# Patient Record
Sex: MALE | Race: WHITE | NOT HISPANIC OR LATINO | Employment: OTHER | URBAN - METROPOLITAN AREA
[De-identification: names, ages, dates, MRNs, and addresses within clinical notes are randomized per-mention and may not be internally consistent; named-entity substitution may affect disease eponyms.]

---

## 2023-12-27 PROBLEM — I47.10 SUPRAVENTRICULAR TACHYCARDIA (CMS-HCC): Status: ACTIVE | Noted: 2023-12-27

## 2023-12-27 PROBLEM — U07.1 COVID-19: Status: ACTIVE | Noted: 2023-12-27

## 2023-12-27 PROBLEM — G70.00 MYASTHENIA GRAVIS (MULTI): Status: ACTIVE | Noted: 2023-12-27

## 2023-12-27 PROBLEM — R00.0 TACHYCARDIA: Status: ACTIVE | Noted: 2023-12-27

## 2023-12-27 PROBLEM — I48.19 PERSISTENT ATRIAL FIBRILLATION (MULTI): Status: ACTIVE | Noted: 2023-12-27

## 2023-12-27 PROBLEM — Q21.10 ATRIAL SEPTAL DEFECT (HHS-HCC): Status: ACTIVE | Noted: 2023-12-27

## 2023-12-27 PROBLEM — I10 BENIGN ESSENTIAL HYPERTENSION: Status: ACTIVE | Noted: 2023-12-27

## 2023-12-27 PROBLEM — E78.5 DYSLIPIDEMIA: Status: ACTIVE | Noted: 2023-12-27

## 2023-12-27 PROBLEM — I48.91 ATRIAL FIBRILLATION (MULTI): Status: ACTIVE | Noted: 2023-12-27

## 2023-12-27 PROBLEM — R06.02 SHORTNESS OF BREATH: Status: ACTIVE | Noted: 2023-12-27

## 2023-12-27 RX ORDER — CHOLECALCIFEROL (VITAMIN D3) 25 MCG
2 TABLET ORAL DAILY
COMMUNITY

## 2023-12-27 RX ORDER — METOPROLOL SUCCINATE 25 MG/1
25 TABLET, EXTENDED RELEASE ORAL DAILY
COMMUNITY
Start: 2023-02-23 | End: 2024-05-13 | Stop reason: SDUPTHER

## 2023-12-27 RX ORDER — APIXABAN 5 MG/1
5 TABLET, FILM COATED ORAL 2 TIMES DAILY
COMMUNITY
Start: 2021-06-08 | End: 2024-05-13 | Stop reason: SDUPTHER

## 2023-12-27 RX ORDER — FERROUS SULFATE 325(65) MG
1 TABLET ORAL DAILY
COMMUNITY
End: 2024-05-13 | Stop reason: WASHOUT

## 2023-12-27 RX ORDER — LATANOPROST 50 UG/ML
1 SOLUTION/ DROPS OPHTHALMIC
COMMUNITY

## 2024-05-03 ENCOUNTER — TELEPHONE (OUTPATIENT)
Dept: CARDIOLOGY | Facility: CLINIC | Age: 84
End: 2024-05-03
Payer: MEDICARE

## 2024-05-03 NOTE — TELEPHONE ENCOUNTER
Pt called the office stating he is having difficulty breathing x last 2 weeks. He does not describe this as SOB. He is more lethargic.    Pt went to doctor yesterday and has bronchitis and was prescribed an antibiotic.    Pt asking for sooner appt with Dr. Schafer. Transferred to [facility] for soon appt

## 2024-05-06 ENCOUNTER — OFFICE VISIT (OUTPATIENT)
Dept: CARDIOLOGY | Facility: CLINIC | Age: 84
End: 2024-05-06
Payer: MEDICARE

## 2024-05-06 VITALS
WEIGHT: 197.2 LBS | DIASTOLIC BLOOD PRESSURE: 84 MMHG | HEART RATE: 88 BPM | HEIGHT: 72 IN | BODY MASS INDEX: 26.71 KG/M2 | SYSTOLIC BLOOD PRESSURE: 128 MMHG

## 2024-05-06 DIAGNOSIS — I48.11 LONGSTANDING PERSISTENT ATRIAL FIBRILLATION (MULTI): ICD-10-CM

## 2024-05-06 DIAGNOSIS — R06.02 SHORTNESS OF BREATH: Primary | ICD-10-CM

## 2024-05-06 PROCEDURE — 3079F DIAST BP 80-89 MM HG: CPT | Performed by: NURSE PRACTITIONER

## 2024-05-06 PROCEDURE — 1160F RVW MEDS BY RX/DR IN RCRD: CPT | Performed by: NURSE PRACTITIONER

## 2024-05-06 PROCEDURE — 3074F SYST BP LT 130 MM HG: CPT | Performed by: NURSE PRACTITIONER

## 2024-05-06 PROCEDURE — 1159F MED LIST DOCD IN RCRD: CPT | Performed by: NURSE PRACTITIONER

## 2024-05-06 PROCEDURE — 99213 OFFICE O/P EST LOW 20 MIN: CPT | Performed by: NURSE PRACTITIONER

## 2024-05-06 NOTE — PROGRESS NOTES
Vimal Luther is a 84 y.o. male that presents to the office today for cardiac evaluation.  He follows with his  primary cardiologist Dr. Schafer and was added to my schedule today.      Per Dr. Schafer office notes, he has a PMH of persistent atrial fibrillation and PSVT. 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 He underwent cardioversion by Dr. Zimmerman in April 2015. At the time of a follow-up visit in July 2018 he was noted to have recurrent asymptomatic atrial fibrillation with controlled heart rates. A Holter monitor done July 19, 2018 showed persistent atrial fibrillation throughout the study with an average heart rate 80 bpm. He opted for a rate control strategy. He is chronically anticoagulated with Eliquis 5 mg twice daily. An echocardiogram performed in October 2018 showed normal LV systolic function and trivial tricuspid regurgitation with estimated RVSP of 40 mmHg.      He was diagnosed with Covid 19 pneumonia on February 8, 2021. He was admitted to [facility]. He was hospitalized for 23 days. His initial EKG showed atrial fibrillation with a heart rate of 110 bpm. Troponin was negative. He was diagnosed with acute respiratory failure with hypoxia as well as ARDS. He was treated with remdesivir and Decadron. During that admission his Toprol-XL was changed to Cardizem CD.     At his last office visit with Dr. Schafer 9/7/2023, he reported he continued to do well since his last visit. He remains very active. He golfs about four days a week at [employer].     He reports shortness of breath with exertion that has been ongoing for approximately 4 months. He was recently diagnosed with bronchitis but feels that shortness of breath is different. He continues to be active with golfing and working out at the gym.  He denies any chest pain, chest pressure, chest tightness, palpitations, lightheadedness or dizziness.       Testing Reviewed  10/4/2023 labs: NA  141, K4.7, BUN 26, creatinine 1.13, AST 32, ALT 26, WBC 4.4, Hgb 15,      Assessment/Plan  Persistent atrial fibrillation:  Rate control strategy.  He did not bring medication bottles with him to office visit today and is unsure of medications or dosages.  Anticoagulation:  Eliquis. Leonard bleeding. Reports compliance.   Shortness of breath:  Reports worsening exertional shortness of breath.  Obtain labs basic and CBC along with echocardiogram to assess for valve and pump function.   Follow with Dr. Schafer after testing for results and further recommendations.       Patient Active Problem List   Diagnosis    Atrial septal defect (HHS-HCC)    Benign essential hypertension    COVID-19    Dyslipidemia    Myasthenia gravis (Multi)    Atrial fibrillation (Multi)    Persistent atrial fibrillation (Multi)    Supraventricular tachycardia (CMS-HCC)    Shortness of breath    Tachycardia            No past medical history on file.      Current Outpatient Medications:     cholecalciferol (Vitamin D-3) 25 MCG (1000 UT) tablet, Take 2 tablets (50 mcg) by mouth once daily., Disp: , Rfl:     Eliquis 5 mg tablet, Take 1 tablet (5 mg) by mouth 2 times a day., Disp: , Rfl:     ferrous sulfate, 325 mg ferrous sulfate, tablet, Take 1 tablet by mouth once daily., Disp: , Rfl:     latanoprost (Xalatan) 0.005 % ophthalmic solution, Administer 1 drop into both eyes once daily., Disp: , Rfl:     metoprolol succinate XL (Toprol-XL) 25 mg 24 hr tablet, Take 1 tablet (25 mg) by mouth once daily., Disp: , Rfl:     Patient has no known allergies.    No family history on file.    Past Surgical History:   Procedure Laterality Date    OTHER SURGICAL HISTORY  10/08/2021    Cardioversion    OTHER SURGICAL HISTORY  10/08/2021    Catheter ablation    OTHER SURGICAL HISTORY  10/08/2021    Knee replacement    OTHER SURGICAL HISTORY  10/08/2021    Thymectomy          Review of systems  Constitutional: No weight loss, fever, chills, weakness or  fatigue  HEENT: No visual loss, blurred vision, double vision or yellow sclerae  Skin: No rash or itching  Cardiovascular: No chest pain, pressure or discomfort, No palpitations or edema.  Respiratory: Positive for exertional  shortness of breath, denies cough or sputum  Gastrointestinal: No nausea, vomiting or diarrhea. No bloody or dark tarry stools.  Neurological: No headache, lightheadedness, dizziness, syncope.   Musculoskeletal: No muscle, back pain, joint pain or stiffness.  Hematologic: No anemia, bleeding or bruising.    /84 (BP Location: Left arm, Patient Position: Sitting)   Pulse 88 Comment: Apical  Ht 1.829 m (6')   Wt 89.4 kg (197 lb 3.2 oz)   BMI 26.75 kg/m²     Patient Active Problem List   Diagnosis    Atrial septal defect (HHS-HCC)    Benign essential hypertension    COVID-19    Dyslipidemia    Myasthenia gravis (Multi)    Atrial fibrillation (Multi)    Persistent atrial fibrillation (Multi)    Supraventricular tachycardia (CMS-HCC)    Shortness of breath    Tachycardia         Physical Exam  Constitutional: Well developed, awake/alert x 3, no distress.  Head/Neck: No JVD,   Respiratory/Thorax: patent airways, CTAB, normal breath sounds with good expansion.  Cardiovascular: Regular rate and rhythm, no murmurs, normal S1 and S2,   Gastrointestinal: Non distended, soft, non-tender, no rebound tenderness or guarding.  Extremities: No cyanosis, edema.    Neurological: Alert and oriented x 3. Moves extremities spontaneous with purpose.  Psychological: Appropriate mood and behavior  Skin: Warm and Dry. No lesions or rashes.         Please excuse any errors in grammar or translation related to dictation, voice recognition software was used to prepare this document.        Medication list updated verbally with patient. Each medication reviewed one by one.  Patient did not bring a list or bottles.  Nelli Doyle, CMA

## 2024-05-10 ENCOUNTER — LAB (OUTPATIENT)
Dept: LAB | Facility: LAB | Age: 84
End: 2024-05-10
Payer: MEDICARE

## 2024-05-10 ENCOUNTER — ANCILLARY PROCEDURE (OUTPATIENT)
Dept: CARDIOLOGY | Facility: HOSPITAL | Age: 84
End: 2024-05-10
Payer: MEDICARE

## 2024-05-10 DIAGNOSIS — R06.02 SHORTNESS OF BREATH: ICD-10-CM

## 2024-05-10 LAB
ANION GAP SERPL CALC-SCNC: 11 MMOL/L (ref 10–20)
AORTIC VALVE MEAN GRADIENT: 3 MMHG
AORTIC VALVE PEAK VELOCITY: 1.16 M/S
AV PEAK GRADIENT: 5.4 MMHG
AVA (PEAK VEL): 2.69 CM2
AVA (VTI): 3.07 CM2
BUN SERPL-MCNC: 28 MG/DL (ref 6–23)
CALCIUM SERPL-MCNC: 8.8 MG/DL (ref 8.6–10.3)
CHLORIDE SERPL-SCNC: 109 MMOL/L (ref 98–107)
CO2 SERPL-SCNC: 29 MMOL/L (ref 21–32)
CREAT SERPL-MCNC: 1.03 MG/DL (ref 0.5–1.3)
EGFRCR SERPLBLD CKD-EPI 2021: 73 ML/MIN/1.73M*2
EJECTION FRACTION APICAL 4 CHAMBER: 50
ERYTHROCYTE [DISTWIDTH] IN BLOOD BY AUTOMATED COUNT: 13.2 % (ref 11.5–14.5)
GLUCOSE SERPL-MCNC: 104 MG/DL (ref 74–99)
HCT VFR BLD AUTO: 45.2 % (ref 41–52)
HGB BLD-MCNC: 15.1 G/DL (ref 13.5–17.5)
LEFT VENTRICLE INTERNAL DIMENSION DIASTOLE: 4.04 CM (ref 3.5–6)
LEFT VENTRICULAR OUTFLOW TRACT DIAMETER: 2.4 CM
LV EJECTION FRACTION BIPLANE: 56 %
MCH RBC QN AUTO: 30.8 PG (ref 26–34)
MCHC RBC AUTO-ENTMCNC: 33.4 G/DL (ref 32–36)
MCV RBC AUTO: 92 FL (ref 80–100)
MITRAL VALVE E/A RATIO: 1.53
MITRAL VALVE E/E' RATIO: 9.1
NRBC BLD-RTO: 0 /100 WBCS (ref 0–0)
PLATELET # BLD AUTO: 300 X10*3/UL (ref 150–450)
POTASSIUM SERPL-SCNC: 4.8 MMOL/L (ref 3.5–5.3)
RBC # BLD AUTO: 4.91 X10*6/UL (ref 4.5–5.9)
RIGHT VENTRICLE PEAK SYSTOLIC PRESSURE: 35 MMHG
SODIUM SERPL-SCNC: 144 MMOL/L (ref 136–145)
WBC # BLD AUTO: 6.3 X10*3/UL (ref 4.4–11.3)

## 2024-05-10 PROCEDURE — 93306 TTE W/DOPPLER COMPLETE: CPT | Performed by: INTERNAL MEDICINE

## 2024-05-10 PROCEDURE — 36415 COLL VENOUS BLD VENIPUNCTURE: CPT

## 2024-05-10 PROCEDURE — 85027 COMPLETE CBC AUTOMATED: CPT

## 2024-05-10 PROCEDURE — 93306 TTE W/DOPPLER COMPLETE: CPT

## 2024-05-10 PROCEDURE — 80048 BASIC METABOLIC PNL TOTAL CA: CPT

## 2024-05-13 ENCOUNTER — OFFICE VISIT (OUTPATIENT)
Dept: CARDIOLOGY | Facility: CLINIC | Age: 84
End: 2024-05-13
Payer: MEDICARE

## 2024-05-13 VITALS
HEIGHT: 72 IN | WEIGHT: 195 LBS | HEART RATE: 92 BPM | BODY MASS INDEX: 26.41 KG/M2 | SYSTOLIC BLOOD PRESSURE: 114 MMHG | DIASTOLIC BLOOD PRESSURE: 76 MMHG

## 2024-05-13 DIAGNOSIS — R06.02 SHORTNESS OF BREATH: ICD-10-CM

## 2024-05-13 DIAGNOSIS — I10 BENIGN ESSENTIAL HYPERTENSION: ICD-10-CM

## 2024-05-13 DIAGNOSIS — E78.5 DYSLIPIDEMIA: ICD-10-CM

## 2024-05-13 DIAGNOSIS — I47.10 SUPRAVENTRICULAR TACHYCARDIA (CMS-HCC): ICD-10-CM

## 2024-05-13 DIAGNOSIS — I10 PRIMARY HYPERTENSION: ICD-10-CM

## 2024-05-13 DIAGNOSIS — I48.11 LONGSTANDING PERSISTENT ATRIAL FIBRILLATION (MULTI): ICD-10-CM

## 2024-05-13 DIAGNOSIS — I48.19 PERSISTENT ATRIAL FIBRILLATION (MULTI): Primary | ICD-10-CM

## 2024-05-13 PROCEDURE — 99213 OFFICE O/P EST LOW 20 MIN: CPT | Performed by: INTERNAL MEDICINE

## 2024-05-13 PROCEDURE — 3078F DIAST BP <80 MM HG: CPT | Performed by: INTERNAL MEDICINE

## 2024-05-13 PROCEDURE — 1159F MED LIST DOCD IN RCRD: CPT | Performed by: INTERNAL MEDICINE

## 2024-05-13 PROCEDURE — 1160F RVW MEDS BY RX/DR IN RCRD: CPT | Performed by: INTERNAL MEDICINE

## 2024-05-13 PROCEDURE — 1036F TOBACCO NON-USER: CPT | Performed by: INTERNAL MEDICINE

## 2024-05-13 PROCEDURE — 3074F SYST BP LT 130 MM HG: CPT | Performed by: INTERNAL MEDICINE

## 2024-05-13 RX ORDER — METOPROLOL SUCCINATE 25 MG/1
25 TABLET, EXTENDED RELEASE ORAL DAILY
Qty: 90 TABLET | Refills: 1 | Status: SHIPPED | OUTPATIENT
Start: 2024-05-13

## 2024-05-13 NOTE — PROGRESS NOTES
Patient:  Vimal Luther  YOB: 1940  MRN: [id number]       HPI:       Vimal Luther is a 84 y.o. male who returns today for cardiac follow-up.  He has a history of persistent atrial fibrillation and PSVT. 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 He underwent cardioversion by Dr. Zimmerman in April 2015. At the time of a follow-up visit in July 2018 he was noted to have recurrent asymptomatic atrial fibrillation with controlled heart rates. A Holter monitor done July 19, 2018 showed persistent atrial fibrillation throughout the study with an average heart rate 80 bpm. He opted for a rate control strategy. He is chronically anticoagulated with Eliquis 5 mg twice daily. An echocardiogram performed in October 2018 showed normal LV systolic function and trivial tricuspid regurgitation with estimated RVSP of 40 mmHg.  Follow-up echocardiogram May 10, 2024 showed estimated LV ejection fraction 65%.  Moderate biatrial enlargement.  Mild (1+) mitral and tricuspid regurgitation.  Estimated RVSP 35 mmHg.     He was diagnosed with Covid 19 pneumonia on February 8, 2021. He was admitted to [facility]. He was hospitalized for 23 days. His initial EKG showed atrial fibrillation with a heart rate of 110 bpm. Troponin was negative. He was diagnosed with acute respiratory failure with hypoxia as well as ARDS. He was treated with remdesivir and Decadron. During that admission his Toprol-XL was changed to Cardizem CD.      He has been doing reasonably well since his last visit.  He is followed by urology for prostate cancer.  He was the  but retired more than 20 years ago. He remains reasonably active. He is planning to golf regularly this summer.  He denies any chest pain or shortness of breath at rest. He denies any orthopnea, PND, or increasing peripheral edema. He denies any palpitations, lightheadedness, near-syncope, or syncope. He denies  any fever, chills, or cough. Lab studies dated May 10, 2024 showed a normal CBC and BMP with exception of BUN 28 and chloride 109.  LDL cholesterol October 4, 2023 was 98.  His blood pressures are well-controlled.  He is currently free of any anginal or CHF symptoms.  He will be continued on his same medications.  Other details as noted below.     The above portion of this note was dictated by me using voice recognition software. I personally performed the services described in the documentation. The scribe entering the documentation below was in my presence. I affirm that the information is both accurate and complete.       Objective:     Vitals:    05/13/24 1323   BP: 114/76   Pulse: 92       Wt Readings from Last 4 Encounters:   05/13/24 88.5 kg (195 lb)   05/06/24 89.4 kg (197 lb 3.2 oz)   09/07/23 90.3 kg (199 lb 2 oz)   02/23/23 93.9 kg (207 lb)       Allergies:     No Known Allergies       Medications:     Current Outpatient Medications   Medication Instructions    cholecalciferol (Vitamin D-3) 25 MCG (1000 UT) tablet 2 tablets, oral, Daily    Eliquis 5 mg, oral, 2 times daily    ferrous sulfate, 325 mg ferrous sulfate, tablet 1 tablet, oral, Daily    latanoprost (Xalatan) 0.005 % ophthalmic solution 1 drop, Both Eyes, Daily RT    metoprolol succinate XL (TOPROL-XL) 25 mg, oral, Daily       Physical Examination:   GENERAL:  Well developed, well nourished, in no acute distress.  CHEST:  Symmetric and nontender.  NEURO/PSYCH:  Alert and oriented times three with approppriate behavior and responses.  NECK:  Supple, no JVD, no bruit.  LUNGS:  Clear to auscultation bilaterally, normal respiratory effort.  HEART:  Rate and rhythm regular with no evident murmur, no gallop appreciated.        There are no rubs, clicks or heaves.  EXTREMITIES:  Warm with good color, no clubbing or cyanosis.  There is no edema noted.  PERIPHERAL VASCULAR:  Pulses present and equally palpable; 2+ throughout.      Lab:     CBC:   Lab  Results   Component Value Date    WBC 6.3 05/10/2024    RBC 4.91 05/10/2024    HGB 15.1 05/10/2024    HCT 45.2 05/10/2024     05/10/2024        CMP:    Lab Results   Component Value Date     05/10/2024    K 4.8 05/10/2024     (H) 05/10/2024    CO2 29 05/10/2024    BUN 28 (H) 05/10/2024    CREATININE 1.03 05/10/2024    GLUCOSE 104 (H) 05/10/2024    CALCIUM 8.8 05/10/2024       Magnesium:    Lab Results   Component Value Date    MG 2.20 06/06/2019       BNP:   Lab Results   Component Value Date    BNP 51 06/06/2019        PT/INR:    Lab Results   Component Value Date    PROTIME 14.8 (H) 06/06/2019    INR 1.3 (H) 06/06/2019       HgBA1c:    Lab Results   Component Value Date    HGBA1C 5.3 10/04/2023       BMP:  Lab Results   Component Value Date     05/10/2024     06/06/2019    K 4.8 05/10/2024    K 4.0 06/06/2019     (H) 05/10/2024     06/06/2019    CO2 29 05/10/2024    CO2 27 06/06/2019    BUN 28 (H) 05/10/2024    BUN 25 (H) 06/06/2019    CREATININE 1.03 05/10/2024    CREATININE 1.00 06/06/2019       CBC:  Lab Results   Component Value Date    WBC 6.3 05/10/2024    WBC 4.4 06/06/2019    RBC 4.91 05/10/2024    RBC 5.41 06/06/2019    HGB 15.1 05/10/2024    HGB 16.3 06/06/2019    HCT 45.2 05/10/2024    HCT 47.6 06/06/2019    MCV 92 05/10/2024    MCV 88 06/06/2019    MCH 30.8 05/10/2024    MCHC 33.4 05/10/2024    MCHC 34.2 06/06/2019    RDW 13.2 05/10/2024    RDW 12.7 06/06/2019     05/10/2024     06/06/2019       Hepatic Function Panel:    Lab Results   Component Value Date    ALKPHOS 88 06/06/2019    ALT 26 06/06/2019    AST 21 06/06/2019    PROT 6.6 06/06/2019    BILITOT 1.5 (H) 06/06/2019       Diagnostic Studies:     Electrocardiogram 12 Lead  Result Date: 2/16/2023    Atrial fibrillation Nonspecific ST abnormality Abnormal ECG No previous ECGs available Confirmed by ARNLOD ZHANG MD ([id number]) on 6/8/2019 2:25:09 PM    Problem List:     Patient Active Problem List    Diagnosis    Atrial septal defect (HHS-HCC)    Benign essential hypertension    COVID-19    Dyslipidemia    Myasthenia gravis (Multi)    Atrial fibrillation (Multi)    Persistent atrial fibrillation (Multi)    Supraventricular tachycardia (CMS-HCC)    Shortness of breath    Tachycardia       Asessment:     Problem List Items Addressed This Visit             ICD-10-CM    Benign essential hypertension I10    Dyslipidemia E78.5    Atrial fibrillation (Multi) I48.91    Relevant Medications    apixaban (Eliquis) 5 mg tablet    metoprolol succinate XL (Toprol-XL) 25 mg 24 hr tablet    Other Relevant Orders    Follow Up In Cardiology    [facility]    Comprehensive metabolic panel    Persistent atrial fibrillation (Multi) - Primary I48.19    Relevant Medications    metoprolol succinate XL (Toprol-XL) 25 mg 24 hr tablet    Supraventricular tachycardia (CMS-HCC) I47.10    Relevant Medications    metoprolol succinate XL (Toprol-XL) 25 mg 24 hr tablet    Shortness of breath R06.02    Relevant Orders    Follow Up In Cardiology     Other Visit Diagnoses         Codes    Primary hypertension     I10    Relevant Medications    metoprolol succinate XL (Toprol-XL) 25 mg 24 hr tablet    Other Relevant Orders    Follow Up In Cardiology    [facility]    Comprehensive metabolic panel

## 2024-08-30 ENCOUNTER — APPOINTMENT (OUTPATIENT)
Dept: CARDIOLOGY | Facility: CLINIC | Age: 84
End: 2024-08-30
Payer: MEDICARE

## 2024-09-17 DIAGNOSIS — I10 BENIGN ESSENTIAL HYPERTENSION: ICD-10-CM

## 2024-09-17 RX ORDER — DILTIAZEM HYDROCHLORIDE 180 MG/1
180 CAPSULE, COATED, EXTENDED RELEASE ORAL DAILY
COMMUNITY
Start: 2023-09-07 | End: 2024-09-17 | Stop reason: SDUPTHER

## 2024-09-17 RX ORDER — DILTIAZEM HYDROCHLORIDE 180 MG/1
180 CAPSULE, COATED, EXTENDED RELEASE ORAL DAILY
Qty: 60 CAPSULE | Refills: 0 | Status: SHIPPED | OUTPATIENT
Start: 2024-09-17

## 2024-09-17 NOTE — TELEPHONE ENCOUNTER
Medication request from [facility] for Diltiazem er 180 mg/ 24 hr. 1 Capsule daily.    Pending appt 11/18/24 with Dr. Schafer.    Thelma Gamboa MA

## 2024-11-18 ENCOUNTER — APPOINTMENT (OUTPATIENT)
Dept: CARDIOLOGY | Facility: CLINIC | Age: 84
End: 2024-11-18
Payer: MEDICARE

## 2024-11-18 VITALS
HEIGHT: 72 IN | SYSTOLIC BLOOD PRESSURE: 126 MMHG | WEIGHT: 184.4 LBS | HEART RATE: 98 BPM | DIASTOLIC BLOOD PRESSURE: 84 MMHG | BODY MASS INDEX: 24.98 KG/M2

## 2024-11-18 DIAGNOSIS — I10 BENIGN ESSENTIAL HYPERTENSION: ICD-10-CM

## 2024-11-18 DIAGNOSIS — I10 PRIMARY HYPERTENSION: ICD-10-CM

## 2024-11-18 DIAGNOSIS — I48.11 LONGSTANDING PERSISTENT ATRIAL FIBRILLATION (MULTI): ICD-10-CM

## 2024-11-18 PROCEDURE — 1159F MED LIST DOCD IN RCRD: CPT | Performed by: INTERNAL MEDICINE

## 2024-11-18 PROCEDURE — 3074F SYST BP LT 130 MM HG: CPT | Performed by: INTERNAL MEDICINE

## 2024-11-18 PROCEDURE — 3079F DIAST BP 80-89 MM HG: CPT | Performed by: INTERNAL MEDICINE

## 2024-11-18 PROCEDURE — 99214 OFFICE O/P EST MOD 30 MIN: CPT | Performed by: INTERNAL MEDICINE

## 2024-11-18 RX ORDER — METOPROLOL SUCCINATE 25 MG/1
25 TABLET, EXTENDED RELEASE ORAL DAILY
Qty: 90 TABLET | Refills: 3 | Status: SHIPPED | OUTPATIENT
Start: 2024-11-18 | End: 2025-11-18

## 2024-11-18 RX ORDER — DILTIAZEM HYDROCHLORIDE 180 MG/1
180 CAPSULE, COATED, EXTENDED RELEASE ORAL DAILY
Qty: 90 CAPSULE | Refills: 3 | Status: SHIPPED | OUTPATIENT
Start: 2024-11-18 | End: 2025-11-18

## 2024-11-18 NOTE — PROGRESS NOTES
Patient:  Vimal Luther  YOB: 1940  MRN: [id number]       HPI:       Vimal Luther is a 84 y.o. male who returns today for cardiac follow-up.  He has a history of persistent atrial fibrillation and PSVT. 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 He underwent cardioversion by Dr. Zimmerman in April 2015. At the time of a follow-up visit in July 2018 he was noted to have recurrent asymptomatic atrial fibrillation with controlled heart rates. A Holter monitor done July 19, 2018 showed persistent atrial fibrillation throughout the study with an average heart rate 80 bpm. He opted for a rate control strategy. He is chronically anticoagulated with Eliquis 5 mg twice daily. An echocardiogram performed in October 2018 showed normal LV systolic function and trivial tricuspid regurgitation with estimated RVSP of 40 mmHg.  Follow-up echocardiogram May 10, 2024 showed estimated LV ejection fraction 65%.  Moderate biatrial enlargement.  Mild (1+) mitral and tricuspid regurgitation.  Estimated RVSP 35 mmHg.     He was diagnosed with Covid 19 pneumonia on February 8, 2021. He was admitted to [facility]. He was hospitalized for 23 days. His initial EKG showed atrial fibrillation with a heart rate of 110 bpm. Troponin was negative. He was diagnosed with acute respiratory failure with hypoxia as well as ARDS. He was treated with remdesivir and Decadron.     He has been doing reasonably well since his last visit.  He is followed by urology for prostate cancer.  He was the  but retired more than 20 years ago. He golfs regularly during the summer months.  He denies any chest pain or shortness of breath at rest. He denies any orthopnea, PND, or increasing peripheral edema. He denies any palpitations, lightheadedness, near-syncope, or syncope. He denies any fever, chills, or cough. Lab studies November 12, 2024 showed hemoglobin 12.4 and  hematocrit 38.2.  Comprehensive metabolic profile on October 31, 2024 was normal with exception of alkaline phosphatase 119, glucose 116, BUN 29, and creatinine 1.29.  Lipid profile showed a cholesterol 170, triglycerides 115, HDL 51, and . His blood pressures are well-controlled.  He is currently free of any anginal or CHF symptoms.  Continue Toprol-XL and Cardizem CD for rate control.  Continue anticoagulation with apixaban.  Other details as noted below.     The above portion of this note was dictated by me using voice recognition software. I personally performed the services described in the documentation. The scribe entering the documentation below was in my presence. I affirm that the information is both accurate and complete.       Objective:     Vitals:    11/18/24 1448   BP: 126/84   Pulse: 98       Wt Readings from Last 4 Encounters:   11/18/24 83.6 kg (184 lb 6.4 oz)   05/13/24 88.5 kg (195 lb)   05/06/24 89.4 kg (197 lb 3.2 oz)   09/07/23 90.3 kg (199 lb 2 oz)       Allergies:     No Known Allergies       Medications:     Current Outpatient Medications   Medication Instructions    apixaban (ELIQUIS) 5 mg, oral, 2 times daily    cholecalciferol (Vitamin D-3) 25 MCG (1000 UT) tablet 2 tablets, Daily    dilTIAZem CD (CARDIZEM CD) 180 mg, oral, Daily    latanoprost (Xalatan) 0.005 % ophthalmic solution 1 drop, Daily RT    metoprolol succinate XL (TOPROL-XL) 25 mg, oral, Daily       Physical Examination:   GENERAL:  Well developed, well nourished, in no acute distress.  CHEST:  Symmetric and nontender.  NEURO/PSYCH:  Alert and oriented times three with approppriate behavior and responses.  NECK:  Supple, no JVD, no bruit.  LUNGS:  Clear to auscultation bilaterally, normal respiratory effort.  HEART:  Rate and rhythm irregularly irregular with no evident murmur, no gallop appreciated.        There are no rubs, clicks or heaves.  EXTREMITIES:  Warm with good color, no clubbing or cyanosis.  There is no  edema noted.  PERIPHERAL VASCULAR:  Pulses present and equally palpable; 2+ throughout.      Lab:     CBC:   Lab Results   Component Value Date    WBC 6.3 05/10/2024    RBC 4.91 05/10/2024    HGB 15.1 05/10/2024    HCT 45.2 05/10/2024     05/10/2024        CMP:    Lab Results   Component Value Date     05/10/2024    K 4.8 05/10/2024     (H) 05/10/2024    CO2 29 05/10/2024    BUN 28 (H) 05/10/2024    CREATININE 1.03 05/10/2024    GLUCOSE 104 (H) 05/10/2024    CALCIUM 8.8 05/10/2024       BMP:  Lab Results   Component Value Date     05/10/2024     06/06/2019    K 4.8 05/10/2024    K 4.0 06/06/2019     (H) 05/10/2024     06/06/2019    CO2 29 05/10/2024    CO2 27 06/06/2019    BUN 28 (H) 05/10/2024    BUN 25 (H) 06/06/2019    CREATININE 1.03 05/10/2024    CREATININE 1.00 06/06/2019       CBC:  Lab Results   Component Value Date    WBC 6.3 05/10/2024    WBC 4.4 06/06/2019    RBC 4.91 05/10/2024    RBC 5.41 06/06/2019    HGB 15.1 05/10/2024    HGB 16.3 06/06/2019    HCT 45.2 05/10/2024    HCT 47.6 06/06/2019    MCV 92 05/10/2024    MCV 88 06/06/2019    MCH 30.8 05/10/2024    MCHC 33.4 05/10/2024    MCHC 34.2 06/06/2019    RDW 13.2 05/10/2024    RDW 12.7 06/06/2019     05/10/2024     06/06/2019       Hepatic Function Panel:    Lab Results   Component Value Date    ALKPHOS 88 06/06/2019    ALT 26 06/06/2019    AST 21 06/06/2019    PROT 6.6 06/06/2019    BILITOT 1.5 (H) 06/06/2019       HgBA1c:    Lab Results   Component Value Date    HGBA1C 5.3 10/04/2023       Magnesium:    Lab Results   Component Value Date    MG 2.20 06/06/2019       BNP:   Lab Results   Component Value Date    BNP 51 06/06/2019        PT/INR:    Lab Results   Component Value Date    PROTIME 14.8 (H) 06/06/2019    INR 1.3 (H) 06/06/2019       Diagnostic Studies:     Electrocardiogram 12 Lead  Result Date: 2/16/2023    Atrial fibrillation Nonspecific ST abnormality Abnormal ECG No previous ECGs  available Confirmed by ARNOLD ZHANG MD ([id number]) on 6/8/2019 2:25:09 PM      Problem List:     Patient Active Problem List   Diagnosis    Atrial septal defect (HHS-HCC)    Benign essential hypertension    COVID-19    Dyslipidemia    Myasthenia gravis    Atrial fibrillation (Multi)    Persistent atrial fibrillation (Multi)    Supraventricular tachycardia (CMS-HCC)    Shortness of breath    Tachycardia       Asessment:     Problem List Items Addressed This Visit             ICD-10-CM    Benign essential hypertension I10    Relevant Medications    dilTIAZem CD (Cardizem CD) 180 mg 24 hr capsule    Other Relevant Orders    Follow Up In Cardiology    Atrial fibrillation (Multi) I48.91    Relevant Medications    apixaban (Eliquis) 5 mg tablet    dilTIAZem CD (Cardizem CD) 180 mg 24 hr capsule    metoprolol succinate XL (Toprol-XL) 25 mg 24 hr tablet    Other Relevant Orders    Follow Up In Cardiology     Other Visit Diagnoses         Codes    Primary hypertension     I10    Relevant Medications    metoprolol succinate XL (Toprol-XL) 25 mg 24 hr tablet    Other Relevant Orders    Follow Up In Cardiology

## 2024-11-18 NOTE — PATIENT INSTRUCTIONS
6 month follow up appointment     DID YOU KNOW  We have a pharmacy here in the [facility].  They can fill all prescriptions, not just cardiac medications.  Prescriptions from other pharmacies can easily be transferred to the  pharmacy by the  pharmacist on site.   pharmacies offer FREE HOME DELIVERY on medications to anywhere in [place]. They can sync your medications. Typically prescriptions can be ready in 10 - 15 minutes. If pharmacy is unable to fill your  prescription or if cost is more than your paying now the Pharmacist can easily transfer back to your Pharmacy of choice. Pharmacy phone # [phone].     Please bring all medicines, vitamins, and herbal supplements with you in original bottles to every appointment  Prescriptions will not be filled unless you are compliant with your follow up appointments or have a follow up appointment scheduled as per instruction of your physician. Refills should be requested at the time of your visit.

## 2025-05-19 ENCOUNTER — APPOINTMENT (OUTPATIENT)
Dept: CARDIOLOGY | Facility: CLINIC | Age: 85
End: 2025-05-19
Payer: MEDICARE

## 2025-05-19 VITALS
WEIGHT: 203.4 LBS | SYSTOLIC BLOOD PRESSURE: 134 MMHG | DIASTOLIC BLOOD PRESSURE: 80 MMHG | HEART RATE: 79 BPM | HEIGHT: 72 IN | BODY MASS INDEX: 27.55 KG/M2

## 2025-05-19 DIAGNOSIS — I10 BENIGN ESSENTIAL HYPERTENSION: ICD-10-CM

## 2025-05-19 DIAGNOSIS — I48.19 PERSISTENT ATRIAL FIBRILLATION (MULTI): Primary | ICD-10-CM

## 2025-05-19 DIAGNOSIS — I10 PRIMARY HYPERTENSION: ICD-10-CM

## 2025-05-19 DIAGNOSIS — I12.9 HYPERTENSIVE KIDNEY DISEASE WITH STAGE 3 CHRONIC KIDNEY DISEASE, UNSPECIFIED WHETHER STAGE 3A OR 3B CKD (MULTI): ICD-10-CM

## 2025-05-19 DIAGNOSIS — C61 PROSTATE CANCER (MULTI): ICD-10-CM

## 2025-05-19 DIAGNOSIS — N18.30 HYPERTENSIVE KIDNEY DISEASE WITH STAGE 3 CHRONIC KIDNEY DISEASE, UNSPECIFIED WHETHER STAGE 3A OR 3B CKD (MULTI): ICD-10-CM

## 2025-05-19 DIAGNOSIS — E78.5 DYSLIPIDEMIA: ICD-10-CM

## 2025-05-19 PROCEDURE — 99214 OFFICE O/P EST MOD 30 MIN: CPT | Performed by: INTERNAL MEDICINE

## 2025-05-19 PROCEDURE — 3075F SYST BP GE 130 - 139MM HG: CPT | Performed by: INTERNAL MEDICINE

## 2025-05-19 PROCEDURE — 3079F DIAST BP 80-89 MM HG: CPT | Performed by: INTERNAL MEDICINE

## 2025-05-19 PROCEDURE — 1159F MED LIST DOCD IN RCRD: CPT | Performed by: INTERNAL MEDICINE

## 2025-05-19 RX ORDER — DILTIAZEM HYDROCHLORIDE 180 MG/1
180 CAPSULE, COATED, EXTENDED RELEASE ORAL DAILY
Qty: 90 CAPSULE | Refills: 3 | Status: SHIPPED | OUTPATIENT
Start: 2025-05-19

## 2025-05-19 NOTE — PROGRESS NOTES
Patient:  Vimal Luther  YOB: 1940  MRN: [id number]       Chief Complaint:      Chief Complaint   Patient presents with    Follow-up     Presents today for a check up per pt       HPI:       Vimal Luther is a 85 y.o. male who returns today for cardiac follow-up. He has a history of persistent atrial fibrillation and PSVT. 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 He underwent cardioversion by Dr. Zimmerman in April 2015. He was the  but retired more than 20 years ago. He golfs regularly during the summer months.  At the time of a follow-up visit in July 2018 he was noted to have recurrent asymptomatic atrial fibrillation with controlled heart rates. A Holter monitor done July 19, 2018 showed persistent atrial fibrillation throughout the study with an average heart rate 80 bpm. He opted for a rate control strategy. He is chronically anticoagulated with Eliquis 5 mg twice daily.  Most recent echocardiogram May 10, 2024 showed estimated LV ejection fraction 65%.  Moderate biatrial enlargement.  Mild (1+) mitral and tricuspid regurgitation.  Estimated RVSP 35 mmHg.    He was diagnosed with Covid 19 pneumonia on February 8, 2021. He was admitted to [facility]. He was hospitalized for 23 days. His initial EKG showed atrial fibrillation with a heart rate of 110 bpm. Troponin was negative. He was diagnosed with acute respiratory failure with hypoxia as well as ARDS. He was treated with remdesivir and Decadron.    He has been doing well since his last visit.  He is followed regularly by urology for prostate cancer.  He denies any significant limitations.  He denies any chest pain or shortness of breath at rest. He denies any orthopnea, PND, or increasing peripheral edema. He denies any palpitations, lightheadedness, near-syncope, or syncope. He denies any fever, chills, or cough. Lab studies May 16, 2025 showed a normal CBC and  basic metabolic profile.  Lipid profile on November 12, 2024 showed a cholesterol 170, triglycerides 115, HDL 51, and . His blood pressures are well-controlled.  He is currently free of any anginal or CHF symptoms.  He will be continued on Toprol-XL and Cardizem CD for rate control.  Continue anticoagulation with apixaban.  Other details as noted below.    The above portion of this note was dictated by me using voice recognition software. I personally performed the services described in the documentation. The scribe entering the documentation below was in my presence. I affirm that the information is both accurate and complete.       Objective:     Vitals:    05/19/25 1304   BP: 134/80   Pulse: 79       Wt Readings from Last 4 Encounters:   11/18/24 83.6 kg (184 lb 6.4 oz)   05/13/24 88.5 kg (195 lb)   05/06/24 89.4 kg (197 lb 3.2 oz)   09/07/23 90.3 kg (199 lb 2 oz)       Allergies:     Allergies[1]       Medications:     Current Outpatient Medications   Medication Instructions    apixaban (ELIQUIS) 5 mg, oral, 2 times daily    cholecalciferol (Vitamin D-3) 25 MCG (1000 UT) tablet 2 tablets, Daily    dilTIAZem CD (CARTIA XT) 180 mg, oral, Daily    latanoprost (Xalatan) 0.005 % ophthalmic solution 1 drop, Daily RT    metoprolol succinate XL (TOPROL-XL) 25 mg, oral, Daily       Past Medical History:     Medical History[2]    Social History:     Social History[3]    Family History     Family History[4]    Physical Examination:   GENERAL:  Well developed, well nourished, in no acute distress.  CHEST:  Symmetric and nontender.  NEURO/PSYCH:  Alert and oriented times three with approppriate behavior and responses.  NECK:  Supple, no JVD, no bruit.  LUNGS:  Clear to auscultation bilaterally, normal respiratory effort.  HEART:  Rate and rhythm regu irregularly irregular with no evident murmur, no gallop appreciated.        There are no rubs, clicks or heaves.  EXTREMITIES:  Warm with good color, no clubbing or  cyanosis.  There is no edema noted.  PERIPHERAL VASCULAR:  Pulses present and equally palpable; 2+ throughout.      Lab:     CBC:   Lab Results   Component Value Date    WBC 6.3 05/10/2024    RBC 4.91 05/10/2024    HGB 15.1 05/10/2024    HCT 45.2 05/10/2024     05/10/2024        CMP:    Lab Results   Component Value Date     05/10/2024    K 4.8 05/10/2024     (H) 05/10/2024    CO2 29 05/10/2024    BUN 28 (H) 05/10/2024    CREATININE 1.03 05/10/2024    GLUCOSE 104 (H) 05/10/2024    CALCIUM 8.8 05/10/2024       BMP:  Lab Results   Component Value Date     05/10/2024     06/06/2019    K 4.8 05/10/2024    K 4.0 06/06/2019     (H) 05/10/2024     06/06/2019    CO2 29 05/10/2024    CO2 27 06/06/2019    BUN 28 (H) 05/10/2024    BUN 25 (H) 06/06/2019    CREATININE 1.03 05/10/2024    CREATININE 1.00 06/06/2019       CBC:  Lab Results   Component Value Date    WBC 6.3 05/10/2024    WBC 4.4 06/06/2019    RBC 4.91 05/10/2024    RBC 5.41 06/06/2019    HGB 15.1 05/10/2024    HGB 16.3 06/06/2019    HCT 45.2 05/10/2024    HCT 47.6 06/06/2019    MCV 92 05/10/2024    MCV 88 06/06/2019    MCH 30.8 05/10/2024    MCHC 33.4 05/10/2024    MCHC 34.2 06/06/2019    RDW 13.2 05/10/2024    RDW 12.7 06/06/2019     05/10/2024     06/06/2019       Hepatic Function Panel:    Lab Results   Component Value Date    ALKPHOS 88 06/06/2019    ALT 26 06/06/2019    AST 21 06/06/2019    PROT 6.6 06/06/2019    BILITOT 1.5 (H) 06/06/2019       HgBA1c:    Lab Results   Component Value Date    HGBA1C 5.3 10/04/2023       Magnesium:    Lab Results   Component Value Date    MG 2.20 06/06/2019       BNP:   Lab Results   Component Value Date    BNP 51 06/06/2019        PT/INR:    Lab Results   Component Value Date    PROTIME 14.8 (H) 06/06/2019    INR 1.3 (H) 06/06/2019       Diagnostic Studies:     Electrocardiogram 12 Lead  Result Date: 2/16/2023    Atrial fibrillation Nonspecific ST abnormality Abnormal  ECG No previous ECGs available Confirmed by ARNOLD ZHANG MD ([id number]) on 6/8/2019 2:25:09 PM      Vascular US carotid artery duplex bilateral  Result Date: 6/6/2019  MRN: [id number] Patient Name: TIFFANY MELENDREZ  STUDY: US CAROTID BILATERAL DUPLEX; 6/6/2019 10:24 am  INDICATION: Presyncope and dizziness.      IMPRESSION: Utilizing the peak systolic velocities, the estimated degree of stenosis within the internal carotid arteries is less than 50% bilaterally.  The velocity criteria are extrapolated from diameter data as defined by the Society of Radiologists in Ultrasound Consensus Conference Radiology 2003; 229;340-346.      Problem List:     Problem List[5]      Asessment:     Problem List Items Addressed This Visit           ICD-10-CM    Benign essential hypertension I10    Relevant Medications    dilTIAZem CD (Cartia XT) 180 mg 24 hr capsule    Other Relevant Orders    Follow Up In Cardiology    Basic Metabolic Panel    CBC    Atrial fibrillation (Multi) I48.91    Relevant Medications    dilTIAZem CD (Cartia XT) 180 mg 24 hr capsule    Other Relevant Orders    Follow Up In Cardiology    Basic Metabolic Panel    CBC     Other Visit Diagnoses         Codes      Primary hypertension     I10    Relevant Orders    Follow Up In Cardiology    Basic Metabolic Panel    CBC          I, Nelli Doyle CMA   am scribing for, and in the presence of Dr. Jared Schafer MD, FACC.    I, Dr. Jared Schafer MD, FACC, personally performed the services described in the documentation as scribed by Nelli Doyle CMA   in my presence, and confirm it is both accurate and complete.      Provider Attestation - Scribe documentation    The above portion of this note was dictated by me using voice recognition software.  All medical record entries made by the scribe were at my direction.  The scribe entering the documentation was in my presence.   I have reviewed the chart and agree that the record accurately reflects my personal performance of  the history, physical exam, discussion and plan.            [1] No Known Allergies  [2] No past medical history on file.  [3]   Social History  Tobacco Use    Smoking status: Never    Smokeless tobacco: Never   Substance Use Topics    Alcohol use: Yes     Comment: RARE    Drug use: Never   [4] No family history on file.  [5]   Patient Active Problem List  Diagnosis    Atrial septal defect (HHS-HCC)    Benign essential hypertension    COVID-19    Dyslipidemia    Myasthenia gravis    Atrial fibrillation (Multi)    Persistent atrial fibrillation (Multi)    Supraventricular tachycardia    Shortness of breath    Tachycardia

## 2025-05-19 NOTE — PATIENT INSTRUCTIONS
NONFASTING LABS TO BE DONE 4-7 DAYS PRIOR TO YOUR APPOINTMENT WITH DR     TO SCHEDULE YOUR LAB APPOINTMENT CALL [facility] AT [phone]     Please bring all medicines, vitamins, and herbal supplements with you in original bottles to every appointment! This is the best way to ensure your medication list in your chart is accurate.    Prescriptions will not be filled unless you are compliant with your follow up appointments or have a follow up appointment scheduled as per instruction of your physician. Refills should be requested at the time of your visit.    DID YOU KNOW  We have a pharmacy here in the [facility].  They can fill all prescriptions, not just cardiac medications.  Prescriptions from other pharmacies can easily be transferred to the  pharmacy by the  pharmacist on site.   pharmacies offer FREE HOME DELIVERY on medications to anywhere in [place]. They can sync your medications. Typically prescriptions can be ready in 10 - 15 minutes. If pharmacy is unable to fill your  prescription or if cost is more than your paying now the Pharmacist can easily transfer back to your Pharmacy of choice. Pharmacy phone # [phone].

## 2025-05-20 PROBLEM — N18.30: Status: ACTIVE | Noted: 2025-05-20

## 2025-05-20 PROBLEM — C61 PROSTATE CANCER (MULTI): Status: ACTIVE | Noted: 2025-05-20

## 2025-05-20 PROBLEM — I12.9: Status: ACTIVE | Noted: 2025-05-20

## 2025-11-17 ENCOUNTER — APPOINTMENT (OUTPATIENT)
Dept: CARDIOLOGY | Facility: CLINIC | Age: 85
End: 2025-11-17
Payer: MEDICARE